# Patient Record
Sex: MALE | URBAN - METROPOLITAN AREA
[De-identification: names, ages, dates, MRNs, and addresses within clinical notes are randomized per-mention and may not be internally consistent; named-entity substitution may affect disease eponyms.]

---

## 2020-04-22 ENCOUNTER — OFFICE VISIT (OUTPATIENT)
Dept: URBAN - METROPOLITAN AREA CLINIC 23 | Facility: CLINIC | Age: 30
End: 2020-04-22
Payer: COMMERCIAL

## 2020-04-22 DIAGNOSIS — H53.143 VISUAL DISCOMFORT, BILATERAL: Primary | ICD-10-CM

## 2020-04-22 PROCEDURE — 92004 COMPRE OPH EXAM NEW PT 1/>: CPT | Performed by: OPTOMETRIST

## 2020-04-22 ASSESSMENT — KERATOMETRY
OD: 42.75
OS: 42.75

## 2020-04-22 NOTE — IMPRESSION/PLAN
Impression: Visual discomfort, bilateral: H53.143. Plan: Discussed findings. No problems seen with eyes. Large amount of astigmatism. Pt would like more info on clear lensectomy. Advised pt may need small amount of correction after sx and not covered by insurance.

## 2020-06-05 ENCOUNTER — TESTING ONLY (OUTPATIENT)
Dept: URBAN - METROPOLITAN AREA CLINIC 33 | Facility: CLINIC | Age: 30
End: 2020-06-05
Payer: COMMERCIAL

## 2020-06-05 DIAGNOSIS — Q87.81 ALPORT SYNDROME: ICD-10-CM

## 2020-06-05 DIAGNOSIS — Q12.8: Primary | ICD-10-CM

## 2020-06-05 PROCEDURE — 92004 COMPRE OPH EXAM NEW PT 1/>: CPT | Performed by: OPHTHALMOLOGY

## 2020-06-05 RX ORDER — OFLOXACIN 3 MG/ML
0.3 % SOLUTION/ DROPS OPHTHALMIC
Qty: 5 | Refills: 1 | Status: ACTIVE
Start: 2020-06-05

## 2020-06-05 RX ORDER — PREDNISOLONE ACETATE 10 MG/ML
1 % SUSPENSION/ DROPS OPHTHALMIC
Qty: 10 | Refills: 1 | Status: ACTIVE
Start: 2020-06-05

## 2020-06-05 ASSESSMENT — INTRAOCULAR PRESSURE
OD: 14
OS: 15

## 2020-06-05 ASSESSMENT — VISUAL ACUITY
OD: 20/60
OS: 20/60

## 2020-06-05 NOTE — IMPRESSION/PLAN
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 Condition: quality of life issue. Symptoms: could improve with surgery. Vision: vision affected. 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 Discussed all risks, benefits, procedures and recovery. Patient understands changing glasses will not improve vision. Patient desires to have surgery, recommend phacoemulsification with intraocular lens. RL-2 Recommend standard IOL Aim plano. Schedule clear lens extraction with intraocular lens implant. Recommend Toric IOL.

## 2020-07-09 ENCOUNTER — SURGERY (OUTPATIENT)
Dept: URBAN - METROPOLITAN AREA SURGERY 11 | Facility: SURGERY | Age: 30
End: 2020-07-09
Payer: COMMERCIAL

## 2020-07-09 PROCEDURE — 66999 UNLISTED PX ANT SEGMENT EYE: CPT | Performed by: OPHTHALMOLOGY

## 2020-07-10 ENCOUNTER — POST-OPERATIVE VISIT (OUTPATIENT)
Dept: URBAN - METROPOLITAN AREA CLINIC 23 | Facility: CLINIC | Age: 30
End: 2020-07-10

## 2020-07-10 ASSESSMENT — INTRAOCULAR PRESSURE
OS: 16
OD: 16

## 2020-07-16 ENCOUNTER — POST-OPERATIVE VISIT (OUTPATIENT)
Dept: URBAN - METROPOLITAN AREA CLINIC 23 | Facility: CLINIC | Age: 30
End: 2020-07-16

## 2020-07-16 DIAGNOSIS — Z09 ENCNTR FOR F/U EXAM AFT TRTMT FOR COND OTH THAN MALIG NEOPLM: Primary | ICD-10-CM

## 2020-07-16 ASSESSMENT — VISUAL ACUITY
OS: 20/70
OD: 20/25

## 2020-07-16 ASSESSMENT — INTRAOCULAR PRESSURE
OS: 10
OD: 10

## 2020-07-23 ENCOUNTER — SURGERY (OUTPATIENT)
Dept: URBAN - METROPOLITAN AREA SURGERY 11 | Facility: SURGERY | Age: 30
End: 2020-07-23
Payer: COMMERCIAL

## 2020-07-23 PROCEDURE — 66999 UNLISTED PX ANT SEGMENT EYE: CPT | Performed by: OPHTHALMOLOGY

## 2020-07-24 ENCOUNTER — POST-OPERATIVE VISIT (OUTPATIENT)
Dept: URBAN - METROPOLITAN AREA CLINIC 23 | Facility: CLINIC | Age: 30
End: 2020-07-24
Payer: COMMERCIAL

## 2020-07-24 PROCEDURE — 99024 POSTOP FOLLOW-UP VISIT: CPT | Performed by: OPTOMETRIST

## 2020-07-24 ASSESSMENT — INTRAOCULAR PRESSURE
OS: 8
OD: 12

## 2020-07-29 ENCOUNTER — POST-OPERATIVE VISIT (OUTPATIENT)
Dept: URBAN - METROPOLITAN AREA CLINIC 23 | Facility: CLINIC | Age: 30
End: 2020-07-29
Payer: COMMERCIAL

## 2020-07-29 DIAGNOSIS — Z48.810 ENCNTR FOR SURGICAL AFTCR FOL SURGERY ON THE SENSE ORGANS: ICD-10-CM

## 2020-07-29 PROCEDURE — 99024 POSTOP FOLLOW-UP VISIT: CPT | Performed by: OPHTHALMOLOGY

## 2020-07-29 ASSESSMENT — INTRAOCULAR PRESSURE
OS: 13
OD: 13

## 2020-07-29 ASSESSMENT — VISUAL ACUITY
OD: 20/20
OS: 20/25

## 2020-09-29 ENCOUNTER — POST-OPERATIVE VISIT (OUTPATIENT)
Dept: URBAN - METROPOLITAN AREA CLINIC 23 | Facility: CLINIC | Age: 30
End: 2020-09-29
Payer: COMMERCIAL

## 2020-09-29 ASSESSMENT — INTRAOCULAR PRESSURE
OD: 8
OS: 9

## 2020-09-29 NOTE — IMPRESSION/PLAN
Impression: S/P Lensectomy Toric IOL [id number] +21.5 OS - 68 Days. Encntr for f/u exam aft trtmt for cond oth than malig neoplm  Z09. Excellent post op course   Post operative instructions reviewed - Condition is improving - Plan: Discussed diagnosis. Discussed treatment options. Recommend YAG PC OU. R/B/A discussed and understood by patient and patient elects to proceed with YAG OU as recommended.

## 2020-10-29 ENCOUNTER — SURGERY (OUTPATIENT)
Dept: URBAN - METROPOLITAN AREA SURGERY 11 | Facility: SURGERY | Age: 30
End: 2020-10-29